# Patient Record
Sex: MALE | Race: WHITE | NOT HISPANIC OR LATINO | Employment: OTHER | URBAN - NONMETROPOLITAN AREA
[De-identification: names, ages, dates, MRNs, and addresses within clinical notes are randomized per-mention and may not be internally consistent; named-entity substitution may affect disease eponyms.]

---

## 2017-01-18 DIAGNOSIS — R19.7 DIARRHEA, UNSPECIFIED TYPE: ICD-10-CM

## 2017-01-18 DIAGNOSIS — K52.9 INFLAMMATORY BOWEL DISEASE: ICD-10-CM

## 2017-01-18 RX ORDER — MESALAMINE 0.38 G/1
CAPSULE, EXTENDED RELEASE ORAL
Qty: 360 CAPSULE | Refills: 1 | Status: SHIPPED | OUTPATIENT
Start: 2017-01-18 | End: 2017-10-19

## 2017-07-26 ENCOUNTER — OFFICE VISIT (OUTPATIENT)
Dept: GASTROENTEROLOGY | Facility: CLINIC | Age: 67
End: 2017-07-26

## 2017-07-26 VITALS
RESPIRATION RATE: 16 BRPM | DIASTOLIC BLOOD PRESSURE: 63 MMHG | HEART RATE: 59 BPM | WEIGHT: 164 LBS | SYSTOLIC BLOOD PRESSURE: 161 MMHG | BODY MASS INDEX: 24.86 KG/M2 | TEMPERATURE: 97.7 F | HEIGHT: 68 IN

## 2017-07-26 DIAGNOSIS — K51.311 ULCERATIVE RECTOSIGMOIDITIS WITH RECTAL BLEEDING (HCC): Primary | ICD-10-CM

## 2017-07-26 PROCEDURE — 99214 OFFICE O/P EST MOD 30 MIN: CPT | Performed by: INTERNAL MEDICINE

## 2017-07-26 RX ORDER — MESALAMINE 0.38 G/1
CAPSULE, EXTENDED RELEASE ORAL
Qty: 24 CAPSULE | Refills: 0 | COMMUNITY
Start: 2017-07-26 | End: 2017-10-19

## 2017-07-26 NOTE — PATIENT INSTRUCTIONS
1. Low lactose-moderate residue-low-fat diet.  2. Apriso (mesalamine) capsule 0.375 g. Take 4 capsules in the morning daily.  The patient will be traveling in October 2017 to [place] for couple of weeks.  The patient is concerned about changing the medications while traveling.  He has been advised to continue using the same regimen for now.  However once he finishes the travel the patient may switch to Asacol  mg 1 by mouth twice a day (for insurance reasons).  3. Avoid NSAIDs.  4. Follow-up: The patient will call back.

## 2017-07-26 NOTE — PROGRESS NOTES
Chief Complaint   Patient presents with   • Follow-up     History of Present Illness    The patient came back for follow visit today.  The patient feels better.  The patient denies recent change in bowel habits.  There is no diarrhea or constipation.  Currently, the patient has been having 2 bowel movements a day.  Both are formed.  There is no history of abdominal pain.  There is no history of overt GI bleed (hematemesis melena or hematochezia).  The patient denies nausea or vomiting.  There is no history of reflux.  The patient denies dysphagia or odynophagia.  There is no history of recent significant weight loss.  There is no history of liver or pancreatic disease.  The patient has good energy level.  He denies fever or chills.  There is no history of back pain or joint symptoms.  He denies eye symptoms.  There is no history of rash.    The patient has taken FCI in June 2017.  Since then his insurance has also changed.  There is an issue with his current medication and the insurance.  The patient is planning to take a trip and [place] in October 2017 and has been concerned about it.    Review of Systems   Constitutional: Negative for appetite change, chills, fatigue, fever and unexpected weight change.   HENT: Negative for mouth sores, nosebleeds and trouble swallowing.    Eyes: Negative for discharge and redness.   Respiratory: Positive for apnea. Negative for cough and shortness of breath.    Cardiovascular: Negative for chest pain, palpitations and leg swelling.   Gastrointestinal: Negative for abdominal distention, abdominal pain, anal bleeding, blood in stool, constipation, diarrhea, nausea and vomiting.   Endocrine: Negative for cold intolerance, heat intolerance and polydipsia.   Genitourinary: Negative for dysuria, hematuria and urgency.   Musculoskeletal: Negative for arthralgias, joint swelling and myalgias.   Skin: Negative for rash.   Allergic/Immunologic: Negative for food allergies and  "immunocompromised state.   Neurological: Negative for dizziness, seizures, syncope and headaches.   Hematological: Negative for adenopathy. Does not bruise/bleed easily.   Psychiatric/Behavioral: Negative for dysphoric mood. The patient is not nervous/anxious and is not hyperactive.      Patient Active Problem List   Diagnosis   • Benign non-nodular prostatic hyperplasia without lower urinary tract symptoms     Past Medical History:   Diagnosis Date   • Angiodysplasia of intestine    • Diverticulosis of colon    • Hyperthyroidism    • Internal hemorrhoid      Past Surgical History:   Procedure Laterality Date   • COLONOSCOPY      2003, 2013     Family History   Problem Relation Age of Onset   • Colon cancer Neg Hx      Social History   Substance Use Topics   • Smoking status: Never Smoker   • Smokeless tobacco: Never Used   • Alcohol use Yes      Comment: social       Current Outpatient Prescriptions:   •  finasteride (PROSCAR) 5 MG tablet, Take 1 tablet by mouth Daily., Disp: 90 tablet, Rfl: 3  •  mesalamine (APRISO) 0.375 G 24 hr capsule, Take 4 capsules daily in the am., Disp: 360 capsule, Rfl: 1  •  methimazole (TAPAZOLE) 10 MG tablet, , Disp: , Rfl:   •  mesalamine (APRISO) 0.375 G 24 hr capsule, Use as directed-4 capsules per day, Disp: 24 capsule, Rfl: 0    No Known Allergies    Blood pressure 161/63, pulse 59, temperature 97.7 °F (36.5 °C), resp. rate 16, height 68\" (172.7 cm), weight 164 lb (74.4 kg).    Physical Exam   Constitutional: He is oriented to person, place, and time. He appears well-developed and well-nourished. No distress.   HENT:   Head: Normocephalic and atraumatic.   Right Ear: Hearing and external ear normal.   Left Ear: Hearing and external ear normal.   Nose: Nose normal.   Mouth/Throat: Oropharynx is clear and moist and mucous membranes are normal. Mucous membranes are not pale, not dry and not cyanotic. No oral lesions. No oropharyngeal exudate.   Eyes: Conjunctivae and EOM are normal. " Right eye exhibits no discharge. Left eye exhibits no discharge. No scleral icterus.   Neck: Trachea normal. Neck supple. No JVD present. No edema present. No thyroid mass and no thyromegaly present.   Cardiovascular: Normal rate, regular rhythm, S2 normal and normal heart sounds.  Exam reveals no gallop, no S3 and no friction rub.    No murmur heard.  Pulmonary/Chest: Effort normal and breath sounds normal. No respiratory distress. He has no wheezes. He has no rales. He exhibits no tenderness.   Abdominal: Soft. Normal appearance and bowel sounds are normal. He exhibits no distension, no ascites and no mass. There is no splenomegaly or hepatomegaly. There is no tenderness. There is no rigidity, no rebound and no guarding. No hernia.   Musculoskeletal: He exhibits no tenderness or deformity.       Vascular Status -  His exam exhibits no right foot edema. His exam exhibits no left foot edema.  Lymphadenopathy:     He has no cervical adenopathy.        Left: No supraclavicular adenopathy present.   Neurological: He is alert and oriented to person, place, and time. He has normal strength. No cranial nerve deficit or sensory deficit. He exhibits normal muscle tone. Coordination normal.   Skin: No rash noted. He is not diaphoretic. No cyanosis. No pallor. Nails show no clubbing.   Psychiatric: He has a normal mood and affect. His behavior is normal. Judgment and thought content normal.   Nursing note and vitals reviewed.    Stigmata of chronic liver disease:  None.  Asterixis:  None.    Laboratory Testing:  Upon review of medical records:    Dated December 20, 2016 sodium 144 potassium 4.2 chloride 105 CO2 31 BUN 14 serum creatinine 0.90 glucose 81.  Calcium 8.8.  Albumin 3.7.  T bili 0.5 AST 15 ALT 35 alkaline phosphatase 120.  WBC 6.5 hemoglobin 15.1 hematocrit 47.1 MCV 94.6 and platelet count 210.    Abdominal Imaging:    Upon review of medical records:     Dated 7/21/2016 ultrasound of the kidneys revealed 1 cm right  renal cyst with no solid mass or hydronephrosis. Left kidney normal.    Procedures:   Upon review of medical records:     Dated 9/27/2016 the patient underwent a colonoscopy to terminal ileum which revealed moderately severe prctosigmoid colitis, internal hemorrhoids, and transverse colon polyp. Biopsies from the colon polyp revealed tubular adenoma. Biopsies from the sigmoid colon revealed inflammatory changes. Biopsies from the rectum not only revealed acute and chronic inflammatory changes but also crept abscesses and distortion of the crypts. Changes were suggestive of inflammatory bowel disease. No granulomata were seen. Biopsies from the terminal ileum, cecum and ascending colon, transverse colon and descending colon otherwise were negative.       Assessment and Plan:      Abel was seen today for follow-up.    Diagnoses and all orders for this visit:    Ulcerative rectosigmoiditis with rectal bleeding  Comments:  Stable.    Other orders  -     mesalamine (APRISO) 0.375 G 24 hr capsule; Use as directed-4 capsules per day          Plan  and Patient Instructions:    Patient Instructions   1. Low lactose-moderate residue-low-fat diet.  2. Apriso (mesalamine) capsule 0.375 g. Take 4 capsules in the morning daily.  The patient will be traveling in October 2017 to [place] for couple of weeks.  The patient is concerned about changing the medications while traveling.  He has been advised to continue using the same regimen for now.  However once he finishes the travel the patient may switch to Asacol  mg 1 by mouth twice a day (for insurance reasons).  3. Avoid NSAIDs.  4. Follow-up: The patient will call back.          Gume Calix MD

## 2017-07-31 ENCOUNTER — OFFICE VISIT (OUTPATIENT)
Dept: SURGERY | Facility: CLINIC | Age: 67
End: 2017-07-31

## 2017-07-31 VITALS
HEIGHT: 68 IN | DIASTOLIC BLOOD PRESSURE: 70 MMHG | BODY MASS INDEX: 24.64 KG/M2 | RESPIRATION RATE: 16 BRPM | WEIGHT: 162.6 LBS | HEART RATE: 66 BPM | SYSTOLIC BLOOD PRESSURE: 150 MMHG | TEMPERATURE: 98 F | OXYGEN SATURATION: 98 %

## 2017-07-31 DIAGNOSIS — E04.1 THYROID NODULE: Primary | ICD-10-CM

## 2017-07-31 PROCEDURE — 99204 OFFICE O/P NEW MOD 45 MIN: CPT | Performed by: SURGERY

## 2017-07-31 PROCEDURE — 10022 PR FINE NEEDLE ASP;W/IMAGING GUIDANCE: CPT | Performed by: SURGERY

## 2017-07-31 NOTE — PROGRESS NOTES
Patient: Abel Mesa    YOB: 1950    Date: 07/31/2017    Primary Care Provider: Sloan Lopez MD    Reason for Consultation: thyroid nodule    Chief complaint:   Chief Complaint   Patient presents with   • Thyroid Problem     bilateral thyroid nodules       Subjective .     History of present illness:  Patient has been on thyroid medication for years. He has had it adjusted several times. He was recently sent for an ultrasound of his thyroid and was found to have bilateral thyroid nodules. He is able to palpate the one on his right side. Nodules appeared to have increased in size despite being on Synthroid daily.  Patient denies difficulty swallowing or hoarseness.  No weight gain or weight loss.  No hair loss.  Patient has no significant pain, just feels fullness with swallowing.    Review of Systems   Constitutional: Negative for chills, fever and unexpected weight change.   HENT: Negative for trouble swallowing and voice change.    Eyes: Negative for visual disturbance.   Respiratory: Negative for apnea, cough, chest tightness, shortness of breath and wheezing.    Cardiovascular: Negative for chest pain, palpitations and leg swelling.   Gastrointestinal: Negative for abdominal distention, abdominal pain, anal bleeding, blood in stool, constipation, diarrhea, nausea, rectal pain and vomiting.   Endocrine: Negative for cold intolerance and heat intolerance.   Genitourinary: Negative for difficulty urinating, dysuria, flank pain, scrotal swelling and testicular pain.   Musculoskeletal: Negative for back pain, gait problem and joint swelling.   Skin: Negative for color change, rash and wound.   Neurological: Negative for dizziness, syncope, speech difficulty, weakness, numbness and headaches.   Hematological: Negative for adenopathy. Does not bruise/bleed easily.   Psychiatric/Behavioral: Negative for confusion. The patient is not nervous/anxious.        Allergies:  No Known  "Allergies    Medications:    Current Outpatient Prescriptions:   •  finasteride (PROSCAR) 5 MG tablet, Take 1 tablet by mouth Daily., Disp: 90 tablet, Rfl: 3  •  mesalamine (APRISO) 0.375 G 24 hr capsule, Take 4 capsules daily in the am., Disp: 360 capsule, Rfl: 1  •  mesalamine (APRISO) 0.375 G 24 hr capsule, Use as directed-4 capsules per day, Disp: 24 capsule, Rfl: 0  •  methimazole (TAPAZOLE) 10 MG tablet, , Disp: , Rfl:     History\"  Past Medical History:   Diagnosis Date   • Angiodysplasia of intestine    • Diverticulosis of colon    • Hyperthyroidism    • Internal hemorrhoid        Past Surgical History:   Procedure Laterality Date   • COLONOSCOPY      2003, 2013       Family History   Problem Relation Age of Onset   • Colon cancer Neg Hx        Social History   Substance Use Topics   • Smoking status: Never Smoker   • Smokeless tobacco: Never Used   • Alcohol use Yes      Comment: social        Objective     Vital Signs:   /70  Pulse 66  Temp 98 °F (36.7 °C) (Temporal Artery )   Resp 16  Ht 68\" (172.7 cm)  Wt 162 lb 9.6 oz (73.8 kg)  SpO2 98%  BMI 24.72 kg/m2    Physical Exam:   General Appearance:    Alert, cooperative, in no acute distress   Head:    Normocephalic, without obvious abnormality, atraumatic   Eyes:            Lids and lashes normal, conjunctivae and sclerae normal, no   icterus, no pallor, corneas clear, PERRLA   Ears:    Ears appear intact with no abnormalities noted   Throat:   No oral lesions, no thrush, oral mucosa moist   Neck:   No adenopathy, supple, trachea midline, Bilateral thyromegaly, no   carotid bruit, no JVD   Lungs:     Clear to auscultation,respirations regular, even and                  unlabored    Heart:    Regular rhythm and normal rate, normal S1 and S2, no            murmur, no gallop, no rub, no click   Chest Wall:    No abnormalities observed   Abdomen:     Normal bowel sounds, no masses, no organomegaly, soft        non-tender, non-distended, no " guarding, no rebound                tenderness   Extremities:   Moves all extremities well, no edema, no cyanosis, no             redness   Pulses:   Pulses palpable and equal bilaterally   Skin:   No bleeding, bruising or rash   Lymph nodes:   No palpable adenopathy   Neurologic:   Cranial nerves 2 - 12 grossly intact, sensation intact, DTR       present and equal bilaterally     Results Review:   I reviewed the patient's new clinical results.    Assessment/Plan     1. Thyroid nodule        Patient scheduled for bilateral ultrasound with FNA.  He will call for pathology results in 3 days.    Procedure: FNA bilateral thyroid nodule with ultrasound guidance    I recommend a FNA of the bilateral thyroid lesions. The procedure and risks were clearly explained including bleeding, infection and requirement for re-biopsy and the patient understood these and wishes to proceed.    The patient was brought to the procedure room. Consent and time out were performed. The area was prepped and draped in the usual fashion. 1% lidocaine with epinephrine was infused locally. A 20G needle was used to biopsy the lesions on right and left thyroid with ultrasound guidance.  Minimal blood loss had occurred and hemostasis had been well controlled with pressure.  The patient tolerated the procedure and there were no complications. We will make a f/u appointment to discuss results.      I discussed the patients findings and my recommendations with patient    Electronically signed by Alvaro Henderson MD  07/31/17    Scribed for Alvaro Henderson MD by Merary Higgins. 7/31/2017  2:36 PM      .

## 2017-10-19 DIAGNOSIS — K52.9 IBD (INFLAMMATORY BOWEL DISEASE): Primary | ICD-10-CM

## 2017-10-19 NOTE — TELEPHONE ENCOUNTER
Patient called, Insurance is no longer covering Apriso.  Wants to switch to Asacol which is covered.  Per Dr. Calix this is ok.  Asacol  mg, 2 pills TID.  For about 6 weeks then the dose can be adjusted.

## 2017-10-20 RX ORDER — MESALAMINE 800 MG/1
1600 TABLET, DELAYED RELEASE ORAL 3 TIMES DAILY
Qty: 180 TABLET | Refills: 1 | Status: SHIPPED | OUTPATIENT
Start: 2017-10-20 | End: 2017-10-30 | Stop reason: SDUPTHER

## 2017-10-20 NOTE — TELEPHONE ENCOUNTER
Called patient.  His new script has been sent in.  If he wants to finish the Apriso he has, then start this one this is ok.  He will call us after 5-6 weeks of Asacol and get new instructions on how many to take daily.  After 6 weeks, the dosing will decrease from 6 pills per day to less.  He states understanding.

## 2017-10-30 ENCOUNTER — TELEPHONE (OUTPATIENT)
Dept: GASTROENTEROLOGY | Facility: CLINIC | Age: 67
End: 2017-10-30

## 2017-10-30 DIAGNOSIS — K52.9 IBD (INFLAMMATORY BOWEL DISEASE): Primary | ICD-10-CM

## 2017-10-30 RX ORDER — MESALAMINE 800 MG/1
1600 TABLET, DELAYED RELEASE ORAL 3 TIMES DAILY
Qty: 180 TABLET | Refills: 1 | Status: SHIPPED | OUTPATIENT
Start: 2017-10-30

## 2017-10-30 NOTE — TELEPHONE ENCOUNTER
Faxed script to [facility].  However, also called pharmacy.  Verbally gave script, Asacol  mg, 2 tablets 3 times a day #180, 1 refill to Laura Rodriguez, pharm tech, with readback.

## 2017-10-30 NOTE — TELEPHONE ENCOUNTER
He wants it to the local pharmacy since this will be a short term dosing.  Have D/C'd the script in the system to [facility].

## 2017-12-04 DIAGNOSIS — K52.9 IBD (INFLAMMATORY BOWEL DISEASE): Primary | ICD-10-CM

## 2017-12-05 RX ORDER — MESALAMINE 800 MG/1
1600 TABLET, DELAYED RELEASE ORAL 3 TIMES DAILY
Qty: 180 TABLET | Refills: 0 | Status: SHIPPED | OUTPATIENT
Start: 2017-12-05